# Patient Record
Sex: FEMALE | Race: WHITE
[De-identification: names, ages, dates, MRNs, and addresses within clinical notes are randomized per-mention and may not be internally consistent; named-entity substitution may affect disease eponyms.]

---

## 2017-08-26 ENCOUNTER — HOSPITAL ENCOUNTER (OUTPATIENT)
Dept: HOSPITAL 80 - FIMAGING | Age: 67
End: 2017-08-26
Attending: FAMILY MEDICINE
Payer: COMMERCIAL

## 2017-08-26 DIAGNOSIS — Z12.31: Primary | ICD-10-CM

## 2017-08-26 PROCEDURE — G0202 SCR MAMMO BI INCL CAD: HCPCS

## 2018-09-21 ENCOUNTER — HOSPITAL ENCOUNTER (OUTPATIENT)
Dept: HOSPITAL 80 - BHCLAF | Age: 68
End: 2018-09-21
Attending: INTERNAL MEDICINE
Payer: COMMERCIAL

## 2018-09-21 DIAGNOSIS — Z01.810: Primary | ICD-10-CM

## 2018-09-21 DIAGNOSIS — R42: ICD-10-CM

## 2018-09-21 DIAGNOSIS — R55: ICD-10-CM

## 2018-10-12 ENCOUNTER — HOSPITAL ENCOUNTER (OUTPATIENT)
Dept: HOSPITAL 80 - BHCLAF | Age: 68
End: 2018-10-12
Attending: INTERNAL MEDICINE
Payer: COMMERCIAL

## 2018-10-12 DIAGNOSIS — R55: Primary | ICD-10-CM

## 2018-12-14 ENCOUNTER — HOSPITAL ENCOUNTER (OUTPATIENT)
Dept: HOSPITAL 80 - FIMAGING | Age: 68
End: 2018-12-14
Attending: FAMILY MEDICINE
Payer: COMMERCIAL

## 2018-12-14 DIAGNOSIS — Z12.31: Primary | ICD-10-CM

## 2019-01-12 ENCOUNTER — HOSPITAL ENCOUNTER (EMERGENCY)
Dept: HOSPITAL 80 - FED | Age: 69
Discharge: HOME | End: 2019-01-12
Payer: COMMERCIAL

## 2019-01-12 VITALS — DIASTOLIC BLOOD PRESSURE: 67 MMHG | SYSTOLIC BLOOD PRESSURE: 110 MMHG

## 2019-01-12 DIAGNOSIS — E86.9: ICD-10-CM

## 2019-01-12 DIAGNOSIS — R11.2: ICD-10-CM

## 2019-01-12 DIAGNOSIS — R42: Primary | ICD-10-CM

## 2019-01-12 DIAGNOSIS — Z99.81: ICD-10-CM

## 2019-01-12 DIAGNOSIS — Z90.89: ICD-10-CM

## 2019-01-12 DIAGNOSIS — E03.9: ICD-10-CM

## 2019-01-12 LAB — PLATELET # BLD: 334 10^3/UL (ref 150–400)

## 2019-01-12 PROCEDURE — 96361 HYDRATE IV INFUSION ADD-ON: CPT

## 2019-01-12 PROCEDURE — 71046 X-RAY EXAM CHEST 2 VIEWS: CPT

## 2019-01-12 PROCEDURE — 93005 ELECTROCARDIOGRAM TRACING: CPT

## 2019-01-12 PROCEDURE — 99285 EMERGENCY DEPT VISIT HI MDM: CPT

## 2019-01-12 PROCEDURE — 96375 TX/PRO/DX INJ NEW DRUG ADDON: CPT

## 2019-01-12 PROCEDURE — 96374 THER/PROPH/DIAG INJ IV PUSH: CPT

## 2019-01-12 NOTE — EDPHY
H & P


Stated Complaint: N/V feeling faint and dizzy since ~ 2:30 am


Time Seen by Provider: 01/12/19 09:06


HPI/ROS: 


HPI:  This is a 68-year-old female who presents with





Chief Complaint: N/V feeling faint and dizzy since ~ 2:30 am





Location:  Head


Quality:  Dizziness


Duration:  Since around 2:30 a.m. Approximately 7 hr


Signs and Symptoms: no fever, + nausea, + vomiting x2, no hematemesis, no blood 

in stool, no abdominal bloating, no diarrhea, no back pain, no urinary symptoms

, no vaginal discharge/bleeding, no indigestion, no chest pain, no shortness of 

breath, no headache


Timing:  Acute, constant


Severity:  Moderate


Context:  Patient has a history of hypothyroidism, depression presents with 

waking up this morning around 2:30 a.m. Feeling dizzy that is described as 

lightheaded accompanied by nausea and 2 episodes of vomiting.  She denies that 

the room is spinning, headache, visual changes, fever, upper respiratory 

symptoms.  She has no prior history of vertigo.  She ate a burrito out of the 

freezer around 6:00 p.m. Last night and believes that she may have food 

poisoning.  She denies any abdominal pain, diarrhea, urinary symptoms, back 

pain.  Patient has been sipping on liquids.  Patient denies any rash or facial 

weakness.  Received influenza vaccine this year.  Patient reports that she does 

wear oxygen at night 2 L nasal cannula due to altitude hypoxia and does not 

require oxygen when she is at sea level.  After further questioning patient has 

been on a "Fast Five" for the last 12 days.  A consist of her not eating 

anything until 130 in the afternoon.  She then kidney anything between 130 and 6

:30 p.m..  And then she is not allowed to eat anything from 6:30 p.m. Until 1:

30 p.m. The next day


Modifying Factors:  None





Comment: 








ROS:  A comprehensive 10 system review of systems is otherwise negative aside 

from elements mentioned in the history of present illness. 





MEDICAL/SURGICAL/SOCIAL HISTORY: 


Medical history:  Hypothyroidism, depression, oxygen dependent at night with 2 

L nasal cannula due to altitude hypoxia


Surgical history:  Appendectomy, bladder repair


Social history:  Retired.  Nonsmoker.  Denies illicit drug use.  Family history 

noncontributory.








CONSTITUTIONAL:  Nontoxic-appearing, pale, elderly white female, awake and alert

, no obvious distress


HEENT: Atraumatic and normocephalic, PERRL, EOMI.  Nares patent; no rhinorrhea;

  no nasal mucosal edema, no sinus tenderness. Tympanic membranes clear. 

Oropharynx clear, no exudate and moist pink mucosa.  Airway patent.  No 

lymphadenopathy.  No meningismus.


Cardiovascular: Normal S1/S2, regular rate, regular rhythm, without murmur rub 

or gallop.


PULMONARY/CHEST:  Symmetrical and nontender. Clear to auscultation bilaterally. 

Good air movement. No accessory muscle usage.


ABDOMEN:  Soft, nondistended, nontender, no rebound, no guarding, no peritoneal 

signs, no masses or organomegaly. No CVAT.


EXTREMITIES:  2/2 pulses, strength 5/5, no deformities, no clubbing, no 

cyanosis or edema.


NEUROLOGICAL: no focal neuro deficits.  GCS 15. No reproduction of dizziness 

with [place]-Hallpike maneuver.  No nystagmus.


SKIN: Warm and dry, no erythema. no rash.  Good capillary refill. 








Source: Patient


Exam Limitations: No limitations





- Medical/Surgical History


Hx Asthma: No


Hx Chronic Respiratory Disease: No


Hx Diabetes: No


Hx Cardiac Disease: No


Hx Renal Disease: No


Hx Cirrhosis: No


Hx Alcoholism: No


Hx HIV/AIDS: No


Hx Splenectomy or Spleen Trauma: No


Other PMH: APPY, BLADDER REPAIR





- Social History


Smoking Status: Former smoker


Constitutional: 


 Initial Vital Signs











Temperature (C)  36.7 C   01/12/19 08:58


 


Heart Rate  84   01/12/19 08:58


 


Respiratory Rate  18   01/12/19 08:58


 


Blood Pressure  113/68   01/12/19 08:58


 


O2 Sat (%)  91 L  01/12/19 08:58








 











O2 Delivery Mode               Nasal Cannula


 


O2 (L/minute)                  4














Allergies/Adverse Reactions: 


 





Penicillins Allergy (Verified 05/20/15 17:04)


 


Sulfa (Sulfonamide Antibiotics) Allergy (Verified 05/20/15 17:04)


 








Home Medications: 














 Medication  Instructions  Recorded


 


Synthroid  05/20/15


 


Citalopram  01/12/19


 


Ondansetron Odt [Zofran Odt 4 mg 4 mg PO Q4 PRN #12 tab 01/12/19





(*)]  


 


traZODone  01/12/19














Medical Decision Making





- Diagnostics


EKG Interpretation: 


12 lead EKG:


Indication:  Dizziness


Rhythm:  Normal sinus rhythm


Axis:  Normal


Intervals:  Normal


QRS:  Normal


ST segments:  Flat


INTERPRETATION:  No acute ischemic changes


The 12 lead EKG was interpreted by myself and with attending.





Imaging Results: 


 Imaging Impressions





Chest X-Ray  01/12/19 09:48


Impression: 


1. Chronic or recurrent peribronchial thickening suggesting bronchitis/airways 

disease.


2. Moderate hiatal hernia.


3. Additional findings as above.


 


 


 











ED Course/Re-evaluation: 


Vital signs reviewed and show 91% oxygen saturation on room air.


IV access, laboratory studies including POC troponin, EKG ordered


Abdomen is soft and nontender.  Doubt surgical process or need for imaging.


Patient given 1 L normal saline, IV Zofran 4 mg and p. O. Meclizine 25 mg


0919:  EKG my read with attending shows normal sinus rhythm with a rate of 76 

beats per minute with nonspecific ST changes in flattened T-waves.


0940:  Notified by tech that troponin 0.00


0949:  Notified by RN that when patient was placed on the cardiac monitor her 

O2 sats were 54% on room air.  Placed on 4 L nasal cannula and O2 sats 93%.  

Chest x-ray and BNP level ordered.


0955:  Patient still complaining of nausea.  Another 1 L normal saline for 

total 2 L normal saline and IV promethazine 12.5 mg given


1000:  After further questioning patient patient reports that she is oxygen 

dependent at night 2 L nasal cannula due to elevation hypoxia but does not 

require oxygen at sea level.


1010:  Chest x-ray my read shows moderate hiatal hernia, chronic bronchitic 

changes but no signs of opacity, effusion.


Labs reviewed.  No signs of leukocytosis/anemia/platelet dysfunction/SIS/

elevated LFTs/electrolyte imbalance/pancreatitis/CHF/ACS. 


1046:  Update from RN states that patient is still complaining of dizziness but 

no nausea now.  Has received IV normal saline 1.5 L. IV Ativan 1 mg ordered


1130:  Patient has mild dizziness.  I believe that this is related to her fad 

diet of "fast five" and have advised her to stop fasting for long periods of 

time as this can contribute to her dizziness.  I do not believe that head CT 

imaging is indicated at this time.


 








This patient was seen under the supervision of my secondary supervising 

physician.  I evaluated care for this patient independently.  Discussed this 

patient with Dr. Cohn who did not see the patient.   





Differential Diagnosis: 


Dizziness including but not limited to peripheral and central causes of vertigo

, orthostatic causes including dehydration, and blood loss.








- Data Points


Laboratory Results: 


 Laboratory Results





 01/12/19 09:25 





 01/12/19 09:25 





 











  01/12/19 01/12/19 01/12/19





  09:28 09:25 09:25


 


WBC      





    


 


RBC      





    


 


Hgb      





    


 


Hct      





    


 


MCV      





    


 


MCH      





    


 


MCHC      





    


 


RDW      





    


 


Plt Count      





    


 


MPV      





    


 


Neut % (Auto)      





    


 


Lymph % (Auto)      





    


 


Mono % (Auto)      





    


 


Eos % (Auto)      





    


 


Baso % (Auto)      





    


 


Nucleat RBC Rel Count      





    


 


Absolute Neuts (auto)      





    


 


Absolute Lymphs (auto)      





    


 


Absolute Monos (auto)      





    


 


Absolute Eos (auto)      





    


 


Absolute Basos (auto)      





    


 


Absolute Nucleated RBC      





    


 


Immature Gran %      





    


 


Immature Gran #      





    


 


Sodium      138 mEq/L mEq/L





     (135-145) 


 


Potassium      4.7 mEq/L mEq/L





     (3.5-5.2) 


 


Chloride      105 mEq/L mEq/L





     () 


 


Carbon Dioxide      26 mEq/l mEq/l





     (22-31) 


 


Anion Gap      7 mEq/L mEq/L





     (6-14) 


 


BUN      15 mg/dL mg/dL





     (7-23) 


 


Creatinine      0.8 mg/dL mg/dL





     (0.6-1.0) 


 


Estimated GFR      > 60 





    


 


Glucose      117 mg/dL H mg/dL





     () 


 


Calcium      9.3 mg/dL mg/dL





     (8.5-10.4) 


 


Total Bilirubin      0.4 mg/dL mg/dL





     (0.1-1.4) 


 


Conjugated Bilirubin      0.3 mg/dL mg/dL





     (0.0-0.5) 


 


Unconjugated Bilirubin      0.1 mg/dL mg/dL





     (0.0-1.1) 


 


AST      23 IU/L IU/L





     (14-46) 


 


ALT      23 IU/L IU/L





     (9-52) 


 


Alkaline Phosphatase      68 IU/L IU/L





     () 


 


POC Troponin I  0.00 ng/mL ng/mL    





   (0.00-0.08)   


 


NT-Pro-B Natriuret Pep    60 pg/mL pg/mL  





    (0-125)  


 


Total Protein      7.1 g/dL g/dL





     (6.3-8.2) 


 


Albumin      4.1 g/dL g/dL





     (3.5-5.0) 


 


Lipase      81 IU/L IU/L





     () 














  01/12/19





  09:25


 


WBC  8.53 10^3/uL 10^3/uL





   (3.80-9.50) 


 


RBC  4.74 10^6/uL 10^6/uL





   (4.18-5.33) 


 


Hgb  14.7 g/dL g/dL





   (12.6-16.3) 


 


Hct  44.3 % %





   (38.0-47.0) 


 


MCV  93.5 fL fL





   (81.5-99.8) 


 


MCH  31.0 pg pg





   (27.9-34.1) 


 


MCHC  33.2 g/dL g/dL





   (32.4-36.7) 


 


RDW  13.0 % %





   (11.5-15.2) 


 


Plt Count  334 10^3/uL 10^3/uL





   (150-400) 


 


MPV  9.4 fL fL





   (8.7-11.7) 


 


Neut % (Auto)  79.8 % H %





   (39.3-74.2) 


 


Lymph % (Auto)  14.3 % L %





   (15.0-45.0) 


 


Mono % (Auto)  5.3 % %





   (4.5-13.0) 


 


Eos % (Auto)  0.2 % L %





   (0.6-7.6) 


 


Baso % (Auto)  0.2 % L %





   (0.3-1.7) 


 


Nucleat RBC Rel Count  0.0 % %





   (0.0-0.2) 


 


Absolute Neuts (auto)  6.80 10^3/uL H 10^3/uL





   (1.70-6.50) 


 


Absolute Lymphs (auto)  1.22 10^3/uL 10^3/uL





   (1.00-3.00) 


 


Absolute Monos (auto)  0.45 10^3/uL 10^3/uL





   (0.30-0.80) 


 


Absolute Eos (auto)  0.02 10^3/uL L 10^3/uL





   (0.03-0.40) 


 


Absolute Basos (auto)  0.02 10^3/uL 10^3/uL





   (0.02-0.10) 


 


Absolute Nucleated RBC  0.00 10^3/uL 10^3/uL





   (0-0.01) 


 


Immature Gran %  0.2 % %





   (0.0-1.1) 


 


Immature Gran #  0.02 10^3/uL 10^3/uL





   (0.00-0.10) 


 


Sodium  





  


 


Potassium  





  


 


Chloride  





  


 


Carbon Dioxide  





  


 


Anion Gap  





  


 


BUN  





  


 


Creatinine  





  


 


Estimated GFR  





  


 


Glucose  





  


 


Calcium  





  


 


Total Bilirubin  





  


 


Conjugated Bilirubin  





  


 


Unconjugated Bilirubin  





  


 


AST  





  


 


ALT  





  


 


Alkaline Phosphatase  





  


 


POC Troponin I  





  


 


NT-Pro-B Natriuret Pep  





  


 


Total Protein  





  


 


Albumin  





  


 


Lipase  





  











Medications Given: 


 








Discontinued Medications





Sodium Chloride (Ns)  1,000 mls @ 0 mls/hr IV ONCE ONE; Wide Open


   PRN Reason: Protocol


   Stop: 01/12/19 09:05


   Last Admin: 01/12/19 09:30 Dose:  1,000 mls


Sodium Chloride (Ns)  1,000 mls @ 0 mls/hr IV ONCE ONE; Wide Open


   PRN Reason: Protocol


   Stop: 01/12/19 10:10


   Last Admin: 01/12/19 10:09 Dose:  1,000 mls


Lorazepam (Ativan Injection)  1 mg IVP EDNOW ONE


   Stop: 01/12/19 10:47


   Last Admin: 01/12/19 10:50 Dose:  1 mg


Meclizine HCl (Meclizine Hcl)  25 mg PO EDNOW ONE


   Stop: 01/12/19 09:13


   Last Admin: 01/12/19 09:30 Dose:  25 mg


Ondansetron HCl (Zofran)  4 mg IVP EDNOW ONE


   Stop: 01/12/19 09:13


   Last Admin: 01/12/19 09:30 Dose:  4 mg


Promethazine HCl (Phenergan)  12.5 mg IVP ONCE ONE


   Stop: 01/12/19 10:05


   Last Admin: 01/12/19 10:07 Dose:  12.5 mg





Point of Care Test Results: 


 Chemistry











  01/12/19





  09:28


 


POC Troponin I  0.00 ng/mL ng/mL





   (0.00-0.08) 














Departure





- Departure


Disposition: Home, Routine, Self-Care


Clinical Impression: 


 Dizziness, nonspecific, Dieting





Nausea with vomiting


Qualifiers:


 Vomiting type: unspecified Vomiting Intractability: non-intractable Qualified 

Code(s): R11.2 - Nausea with vomiting, unspecified





Condition: Good


Instructions:  Acute Nausea and Vomiting (ED), Dizziness (ED)


Additional Instructions: 


Please stop dieting and resume regular eating pattern.


I believe that the dieting/fasting has contributed to your dizziness.


Consume a minimum of 8-10 glasses of water or electrolyte fluid replacement 

drinks that include Gatorade, Powerade, Pedialyte. 


Eat a bland diet for the next 48 hours and then slowly advance as tolerated. 


Take Zofran 1 tab every 4 hours as needed for nausea, vomiting. 


Return to the Emergency Room if symptoms do not resolve in the next 72 hours, 

you spike a fever > 102  F, or experience intractable abdominal pain/nausea/

vomiting.  


Referrals: 


NATTY JOSEPH [Non Staff Provider (MD)] - As per Instructions


Prescriptions: 


Ondansetron Odt [Zofran Odt 4 mg (*)] 4 mg PO Q4 PRN #12 tab


 PRN Reason: Nausea/Vomiting, Use 1st

## 2019-01-15 NOTE — CPEKG
Test Reason : OPEN

Blood Pressure : ***/*** mmHG

Vent. Rate : 076 BPM     Atrial Rate : 076 BPM

   P-R Int : 147 ms          QRS Dur : 091 ms

    QT Int : 393 ms       P-R-T Axes : 060 043 066 degrees

   QTc Int : 442 ms

 

Sinus rhythm

 

Confirmed by Barbie Diaz ([id number]) on 1/15/2019 11:16:14 PM

 

Referred By:             Confirmed By:Barbie Diaz